# Patient Record
Sex: MALE | Race: WHITE | URBAN - METROPOLITAN AREA
[De-identification: names, ages, dates, MRNs, and addresses within clinical notes are randomized per-mention and may not be internally consistent; named-entity substitution may affect disease eponyms.]

---

## 2017-09-13 ENCOUNTER — FOLLOW-UP (OUTPATIENT)
Dept: URBAN - METROPOLITAN AREA CLINIC 14 | Facility: CLINIC | Age: 52
Setting detail: DERMATOLOGY
End: 2017-09-13

## 2017-09-13 DIAGNOSIS — C44.222 SQUAMOUS CELL CARCINOMA OF SKIN OF RIGHT EAR AND EXTERNAL AURICULAR CANAL: ICD-10-CM

## 2017-09-13 PROCEDURE — 99213 OFFICE O/P EST LOW 20 MIN: CPT

## 2017-11-30 ENCOUNTER — RX ONLY (RX ONLY)
Age: 52
End: 2017-11-30

## 2017-11-30 RX ORDER — CLOBETASOL PROPIONATE 0.5 MG/G
1 APPLICATION CREAM TOPICAL BID
Qty: 60 | Refills: 2 | Status: DISCONTINUED
Start: 2017-11-30 | End: 2019-09-18

## 2017-12-27 ENCOUNTER — RX ONLY (RX ONLY)
Age: 52
End: 2017-12-27

## 2017-12-27 RX ORDER — APREMILAST 30 MG/1
1 TABLET TABLET, FILM COATED ORAL BID
Qty: 60 | Refills: 6 | Status: DISCONTINUED
Start: 2017-12-27 | End: 2019-09-18

## 2018-03-29 ENCOUNTER — OTHER- (OUTPATIENT)
Dept: URBAN - METROPOLITAN AREA CLINIC 14 | Facility: CLINIC | Age: 53
Setting detail: DERMATOLOGY
End: 2018-03-29

## 2018-03-29 DIAGNOSIS — C44.311 BASAL CELL CARCINOMA OF SKIN OF NOSE: ICD-10-CM

## 2018-03-29 PROCEDURE — 99212 OFFICE O/P EST SF 10 MIN: CPT

## 2018-03-29 RX ORDER — CLOBETASOL PROPIONATE 0.5 MG/G
1 APPLICATION CREAM TOPICAL TID
Qty: 120 | Refills: 1 | Status: DISCONTINUED
Start: 2018-03-29 | End: 2019-03-13

## 2018-09-12 ENCOUNTER — RX ONLY (RX ONLY)
Age: 53
End: 2018-09-12

## 2018-09-12 ENCOUNTER — COMPLETE SKIN EXAM (OUTPATIENT)
Dept: URBAN - METROPOLITAN AREA CLINIC 14 | Facility: CLINIC | Age: 53
Setting detail: DERMATOLOGY
End: 2018-09-12

## 2018-09-12 DIAGNOSIS — Z79.899 OTHER LONG-TERM (CURRENT) DRUG THERAPY: ICD-10-CM

## 2018-09-12 DIAGNOSIS — L70.0 ACNE VULGARIS: ICD-10-CM

## 2018-09-12 PROCEDURE — 99212 OFFICE O/P EST SF 10 MIN: CPT

## 2018-09-12 RX ORDER — CALCIPOTRIENE 50 UG/G
1 APPLICATION AEROSOL, FOAM TOPICAL AS DIRECTED
Qty: 1 | Refills: 3 | Status: DISCONTINUED
Start: 2018-09-12 | End: 2019-09-18

## 2018-09-12 RX ORDER — CALCIPOTRIENE 50 UG/G
1 APPLICATION AEROSOL, FOAM TOPICAL AS DIRECTED
Qty: 120 | Refills: 3
Start: 2018-09-12

## 2018-11-01 ENCOUNTER — RX ONLY (RX ONLY)
Age: 53
End: 2018-11-01

## 2018-11-01 RX ORDER — APREMILAST 30 MG/1
1 TABLET TABLET, FILM COATED ORAL BID
Qty: 60 | Refills: 11 | Status: DISCONTINUED
Start: 2018-11-01 | End: 2019-09-18

## 2019-03-13 ENCOUNTER — FOLLOW-UP (OUTPATIENT)
Dept: URBAN - METROPOLITAN AREA CLINIC 14 | Facility: CLINIC | Age: 54
Setting detail: DERMATOLOGY
End: 2019-03-13

## 2019-03-13 DIAGNOSIS — L57.0 ACTINIC KERATOSIS: ICD-10-CM

## 2019-03-13 PROCEDURE — 99212 OFFICE O/P EST SF 10 MIN: CPT

## 2019-03-13 PROCEDURE — 11900 INJECT SKIN LESIONS </W 7: CPT

## 2019-03-13 RX ORDER — CLOBETASOL PROPIONATE 0.5 MG/G
1 APPLICATION CREAM TOPICAL TID
Qty: 120 | Refills: 1 | Status: DISCONTINUED
Start: 2019-03-13 | End: 2019-08-01

## 2019-05-22 ENCOUNTER — RX ONLY (RX ONLY)
Age: 54
End: 2019-05-22

## 2019-05-22 RX ORDER — FINASTERIDE 5 MG/1
0.5/HALF TABLET TABLET, FILM COATED ORAL DAILY
Qty: 45 | Refills: 4
Start: 2019-05-22

## 2019-08-01 ENCOUNTER — RX ONLY (RX ONLY)
Age: 54
End: 2019-08-01

## 2019-08-01 RX ORDER — CLOBETASOL PROPIONATE 0.5 MG/ML
1 ML SOLUTION TOPICAL DAILY
Qty: 50 | Refills: 2 | Status: DISCONTINUED
Start: 2019-08-01 | End: 2020-04-07

## 2019-08-01 RX ORDER — CLOBETASOL PROPIONATE 0.5 MG/G
1 APPLICATION CREAM TOPICAL TID
Qty: 120 | Refills: 1 | Status: DISCONTINUED
Start: 2019-08-01 | End: 2020-04-08

## 2019-09-18 ENCOUNTER — SKIN CHECK (OUTPATIENT)
Dept: URBAN - METROPOLITAN AREA CLINIC 14 | Facility: CLINIC | Age: 54
Setting detail: DERMATOLOGY
End: 2019-09-18

## 2019-09-18 DIAGNOSIS — L91.8 OTHER HYPERTROPHIC DISORDERS OF THE SKIN: ICD-10-CM

## 2019-09-18 DIAGNOSIS — D22.5 MELANOCYTIC NEVI OF TRUNK: ICD-10-CM

## 2019-09-18 DIAGNOSIS — L82.1 OTHER SEBORRHEIC KERATOSIS: ICD-10-CM

## 2019-09-18 PROCEDURE — 99213 OFFICE O/P EST LOW 20 MIN: CPT

## 2019-09-18 RX ORDER — APREMILAST 30 MG/1
1 TABLET TABLET, FILM COATED ORAL BID
Qty: 60 | Refills: 11 | Status: DISCONTINUED
Start: 2019-09-18 | End: 2020-09-10

## 2020-04-07 ENCOUNTER — RX ONLY (RX ONLY)
Age: 55
End: 2020-04-07

## 2020-04-07 RX ORDER — CLOBETASOL PROPIONATE 0.5 MG/ML
1 ML SOLUTION TOPICAL DAILY
Qty: 50 | Refills: 2 | Status: DISCONTINUED
Start: 2020-04-07 | End: 2020-04-08

## 2020-04-08 ENCOUNTER — RX ONLY (RX ONLY)
Age: 55
End: 2020-04-08

## 2020-04-08 RX ORDER — CLOBETASOL PROPIONATE 0.5 MG/G
1 APPLICATION CREAM TOPICAL TID
Qty: 120 | Refills: 1
Start: 2020-04-08

## 2020-06-22 ENCOUNTER — RX ONLY (RX ONLY)
Age: 55
End: 2020-06-22

## 2020-06-22 RX ORDER — FINASTERIDE 5 MG/1
0.5/HALF TABLET TABLET, FILM COATED ORAL DAILY
Qty: 45 | Refills: 1
Start: 2020-06-22

## 2020-07-08 ENCOUNTER — RX ONLY (RX ONLY)
Age: 55
End: 2020-07-08

## 2020-07-08 RX ORDER — APREMILAST 30 MG/1
1 TABLET TABLET, FILM COATED ORAL BID
Qty: 60 | Refills: 2 | Status: DISCONTINUED
Start: 2020-07-08 | End: 2020-09-10

## 2020-07-13 ENCOUNTER — RX ONLY (RX ONLY)
Age: 55
End: 2020-07-13

## 2020-07-13 RX ORDER — APREMILAST 30 MG/1
1 TABLET TABLET, FILM COATED ORAL BID
Qty: 60 | Refills: 2
Start: 2020-07-13

## 2020-09-08 ENCOUNTER — SKIN CHECK (OUTPATIENT)
Dept: URBAN - METROPOLITAN AREA CLINIC 14 | Facility: CLINIC | Age: 55
Setting detail: DERMATOLOGY
End: 2020-09-08

## 2020-09-08 DIAGNOSIS — D48.5 NEOPLASM OF UNCERTAIN BEHAVIOR OF SKIN: ICD-10-CM

## 2020-09-08 PROCEDURE — 11900 INJECT SKIN LESIONS </W 7: CPT

## 2020-09-10 ENCOUNTER — RX ONLY (RX ONLY)
Age: 55
End: 2020-09-10

## 2020-09-10 RX ORDER — CLOBETASOL PROPIONATE 0.5 MG/ML
1 ML SOLUTION TOPICAL DAILY
Qty: 25 | Refills: 4
Start: 2020-09-10

## 2020-10-02 ENCOUNTER — RX ONLY (RX ONLY)
Age: 55
End: 2020-10-02

## 2020-10-02 RX ORDER — APREMILAST 30 MG/1
1 TABLET TABLET, FILM COATED ORAL TWICE A DAY
Qty: 60 | Refills: 11
Start: 2020-10-02

## 2020-11-30 ENCOUNTER — FOLLOW-UP (OUTPATIENT)
Dept: URBAN - METROPOLITAN AREA CLINIC 14 | Facility: CLINIC | Age: 55
Setting detail: DERMATOLOGY
End: 2020-11-30

## 2021-01-07 ENCOUNTER — RX ONLY (RX ONLY)
Age: 56
End: 2021-01-07

## 2021-01-07 RX ORDER — FINASTERIDE 5 MG/1
1/2 TABLET TABLET, FILM COATED ORAL ONCE A DAY
Qty: 45 | Refills: 3
Start: 2021-01-07

## 2021-04-19 ENCOUNTER — RX ONLY (RX ONLY)
Age: 56
End: 2021-04-19

## 2021-04-19 RX ORDER — CLOBETASOL PROPIONATE 0.5 MG/G
A SMALL AMOUNT CREAM TOPICAL THREE TIMES A DAY
Qty: 50 | Refills: 1
Start: 2021-04-19

## 2021-05-25 ENCOUNTER — FOLLOW-UP (OUTPATIENT)
Dept: URBAN - METROPOLITAN AREA CLINIC 14 | Facility: CLINIC | Age: 56
Setting detail: DERMATOLOGY
End: 2021-05-25

## 2021-05-25 DIAGNOSIS — C44.319 BASAL CELL CARCINOMA OF SKIN OF OTHER PARTS OF FACE: ICD-10-CM

## 2021-05-25 PROCEDURE — 99213 OFFICE O/P EST LOW 20 MIN: CPT

## 2021-05-25 PROCEDURE — 11901 INJECT SKIN LESIONS >7: CPT

## 2021-09-09 ENCOUNTER — RX ONLY (RX ONLY)
Age: 56
End: 2021-09-09

## 2021-09-09 RX ORDER — CLOBETASOL PROPIONATE 0.5 MG/G
A SMALL AMOUNT CREAM TOPICAL THREE TIMES A DAY
Qty: 50 | Refills: 3
Start: 2021-09-09

## 2021-09-09 RX ORDER — CLOBETASOL PROPIONATE 0.5 MG/ML
A SMALL AMOUNT SOLUTION TOPICAL ONCE A DAY
Qty: 50 | Refills: 3
Start: 2021-09-09

## 2021-10-15 ENCOUNTER — RX ONLY (RX ONLY)
Age: 56
End: 2021-10-15

## 2021-10-15 RX ORDER — APREMILAST 30 MG/1
1 TABLET TABLET, FILM COATED ORAL TWICE A DAY
Qty: 60 | Refills: 7
Start: 2021-10-15

## 2022-01-04 ENCOUNTER — RX ONLY (RX ONLY)
Age: 57
End: 2022-01-04

## 2022-01-04 RX ORDER — FINASTERIDE 5 MG/1
1/2 TABLET TABLET, FILM COATED ORAL ONCE A DAY
Qty: 45 | Refills: 1
Start: 2022-01-04

## 2022-02-17 ENCOUNTER — RX ONLY (RX ONLY)
Age: 57
End: 2022-02-17

## 2022-02-17 ENCOUNTER — COMPLETE SKIN EXAM (OUTPATIENT)
Dept: URBAN - METROPOLITAN AREA CLINIC 14 | Facility: CLINIC | Age: 57
Setting detail: DERMATOLOGY
End: 2022-02-17

## 2022-02-17 DIAGNOSIS — L57.0 ACTINIC KERATOSIS: ICD-10-CM

## 2022-02-17 PROCEDURE — 11900 INJECT SKIN LESIONS </W 7: CPT

## 2022-02-17 PROCEDURE — 99214 OFFICE O/P EST MOD 30 MIN: CPT

## 2022-02-17 RX ORDER — AZITHROMYCIN DIHYDRATE 500 MG/1
1 TABLET TABLET, FILM COATED ORAL ONCE A DAY
Qty: 3 | Refills: 1
Start: 2022-02-17

## 2022-06-15 ENCOUNTER — FOLLOW-UP (OUTPATIENT)
Dept: URBAN - METROPOLITAN AREA CLINIC 14 | Facility: CLINIC | Age: 57
Setting detail: DERMATOLOGY
End: 2022-06-15

## 2022-06-15 DIAGNOSIS — L82.0 INFLAMED SEBORRHEIC KERATOSIS: ICD-10-CM

## 2022-06-15 PROCEDURE — 11900 INJECT SKIN LESIONS </W 7: CPT

## 2022-07-05 ENCOUNTER — RX ONLY (RX ONLY)
Age: 57
End: 2022-07-05

## 2022-07-05 RX ORDER — APREMILAST 30 MG/1
1 TABLET TABLET, FILM COATED ORAL TWICE A DAY
Qty: 60 | Refills: 11
Start: 2022-07-05

## 2022-07-12 ENCOUNTER — RX ONLY (RX ONLY)
Age: 57
End: 2022-07-12

## 2022-07-12 RX ORDER — FINASTERIDE 5 MG/1
1/2 TABLET TABLET, FILM COATED ORAL ONCE A DAY
Qty: 23 | Refills: 4
Start: 2022-07-12

## 2022-10-04 ENCOUNTER — RX ONLY (RX ONLY)
Age: 57
End: 2022-10-04

## 2022-10-04 RX ORDER — CLOBETASOL PROPIONATE 0.5 MG/ML
SOLUTION TOPICAL
Qty: 50 | Refills: 3 | Status: ERX | COMMUNITY
Start: 2022-10-04

## 2022-11-15 ENCOUNTER — APPOINTMENT (OUTPATIENT)
Dept: URBAN - METROPOLITAN AREA CLINIC 210 | Age: 57
Setting detail: DERMATOLOGY
End: 2022-11-27

## 2022-11-15 DIAGNOSIS — L40.0 PSORIASIS VULGARIS: ICD-10-CM

## 2022-11-15 DIAGNOSIS — D18.0 HEMANGIOMA: ICD-10-CM

## 2022-11-15 DIAGNOSIS — L72.0 EPIDERMAL CYST: ICD-10-CM

## 2022-11-15 PROBLEM — D18.01 HEMANGIOMA OF SKIN AND SUBCUTANEOUS TISSUE: Status: ACTIVE | Noted: 2022-11-15

## 2022-11-15 PROCEDURE — OTHER INTRALESIONAL KENALOG: OTHER

## 2022-11-15 PROCEDURE — OTHER MIPS QUALITY: OTHER

## 2022-11-15 PROCEDURE — 11900 INJECT SKIN LESIONS </W 7: CPT

## 2022-11-15 PROCEDURE — OTHER COUNSELING: OTHER

## 2022-11-15 PROCEDURE — 99212 OFFICE O/P EST SF 10 MIN: CPT | Mod: 25

## 2022-11-15 ASSESSMENT — LOCATION DETAILED DESCRIPTION DERM
LOCATION DETAILED: LEFT SUPERIOR LATERAL MALAR CHEEK
LOCATION DETAILED: LEFT INFERIOR TEMPLE
LOCATION DETAILED: RIGHT PROXIMAL POSTERIOR THIGH
LOCATION DETAILED: LEFT BUTTOCK
LOCATION DETAILED: LEFT POSTERIOR ANKLE
LOCATION DETAILED: LEFT SUPERIOR CENTRAL MALAR CHEEK

## 2022-11-15 ASSESSMENT — LOCATION ZONE DERM
LOCATION ZONE: TRUNK
LOCATION ZONE: FACE
LOCATION ZONE: LEG

## 2022-11-15 ASSESSMENT — LOCATION SIMPLE DESCRIPTION DERM
LOCATION SIMPLE: LEFT CHEEK
LOCATION SIMPLE: LEFT ANKLE
LOCATION SIMPLE: RIGHT POSTERIOR THIGH
LOCATION SIMPLE: LEFT BUTTOCK
LOCATION SIMPLE: LEFT TEMPLE

## 2022-11-15 NOTE — HPI: PSORIASIS (PATIENT REPORTED)
Have You Been Diagnosed With Psoriatic Arthritis?: yes
Where Is Your Psoriasis Located?: Back of leg

## 2022-11-15 NOTE — PROCEDURE: INTRALESIONAL KENALOG
Medical Necessity Clause: This procedure was medically necessary because the lesions that were treated were:
How Many Mls Were Removed From The 40 Mg/Ml (1ml) Vial When Preparing The Injectable Solution?: 0
Kenalog Preparation: Kenalog
Show Inventory Tab: Hide
Detail Level: Detailed
Include Z78.9 (Other Specified Conditions Influencing Health Status) As An Associated Diagnosis?: No
Total Volume Injected (Ccs- Only Use Numbers And Decimals): 12
Consent: The risks of atrophy were reviewed with the patient.
Validate Note Data When Using Inventory: Yes
Concentration Of Solution Injected (Mg/Ml): 2.5

## 2023-01-02 ENCOUNTER — RX ONLY (RX ONLY)
Age: 58
End: 2023-01-02

## 2023-01-02 RX ORDER — FINASTERIDE 5 MG/1
TABLET, FILM COATED ORAL
Qty: 45 | Refills: 3 | Status: ERX | COMMUNITY
Start: 2023-01-02

## 2023-06-27 ENCOUNTER — RX ONLY (RX ONLY)
Age: 58
End: 2023-06-27

## 2023-06-27 RX ORDER — APREMILAST 30 MG/1
30MG TABLET, FILM COATED ORAL
Qty: 60 | Refills: 6 | Status: ERX

## 2023-09-22 ENCOUNTER — RX ONLY (RX ONLY)
Age: 58
End: 2023-09-22

## 2023-09-22 RX ORDER — AZITHROMYCIN DIHYDRATE 250 MG/1
TABLET, FILM COATED ORAL
Qty: 1 | Refills: 0 | Status: ERX | COMMUNITY
Start: 2023-09-22

## 2023-09-22 RX ORDER — GENTAMICIN SULFATE 1 MG/G
OINTMENT TOPICAL
Qty: 30 | Refills: 1 | Status: ERX | COMMUNITY
Start: 2023-09-22

## 2023-09-22 RX ORDER — KETOCONAZOLE 20 MG/G
CREAM TOPICAL
Qty: 60 | Refills: 1 | Status: ERX | COMMUNITY
Start: 2023-09-22

## 2023-11-15 ENCOUNTER — APPOINTMENT (OUTPATIENT)
Dept: URBAN - METROPOLITAN AREA CLINIC 210 | Age: 58
Setting detail: DERMATOLOGY
End: 2023-11-15

## 2023-11-15 DIAGNOSIS — L40.0 PSORIASIS VULGARIS: ICD-10-CM

## 2023-11-15 PROBLEM — L30.9 DERMATITIS, UNSPECIFIED: Status: ACTIVE | Noted: 2023-11-15

## 2023-11-15 PROCEDURE — OTHER SEPARATE AND IDENTIFIABLE DOCUMENTATION: OTHER

## 2023-11-15 PROCEDURE — OTHER MIPS QUALITY: OTHER

## 2023-11-15 PROCEDURE — OTHER OTEZLA MONITORING: OTHER

## 2023-11-15 PROCEDURE — OTHER COUNSELING: OTHER

## 2023-11-15 PROCEDURE — OTHER INTRALESIONAL KENALOG: OTHER

## 2023-11-15 PROCEDURE — 11900 INJECT SKIN LESIONS </W 7: CPT

## 2023-11-15 PROCEDURE — 99213 OFFICE O/P EST LOW 20 MIN: CPT | Mod: 25

## 2023-11-15 ASSESSMENT — LOCATION DETAILED DESCRIPTION DERM
LOCATION DETAILED: LEFT DISTAL POSTERIOR THIGH
LOCATION DETAILED: LEFT ANTERIOR PROXIMAL THIGH
LOCATION DETAILED: RIGHT DISTAL POSTERIOR THIGH

## 2023-11-15 ASSESSMENT — LOCATION ZONE DERM: LOCATION ZONE: LEG

## 2023-11-15 ASSESSMENT — BSA PSORIASIS: % BODY COVERED IN PSORIASIS: 9

## 2023-11-15 ASSESSMENT — LOCATION SIMPLE DESCRIPTION DERM
LOCATION SIMPLE: LEFT THIGH
LOCATION SIMPLE: RIGHT POSTERIOR THIGH
LOCATION SIMPLE: LEFT POSTERIOR THIGH

## 2023-11-15 ASSESSMENT — ITCH NUMERIC RATING SCALE: HOW SEVERE IS YOUR ITCHING?: 5

## 2023-11-15 NOTE — PROCEDURE: INTRALESIONAL KENALOG
Medical Necessity Clause: This procedure was medically necessary because the lesions that were treated were:
How Many Mls Were Removed From The 40 Mg/Ml (1ml) Vial When Preparing The Injectable Solution?: 0
Kenalog Preparation: Kenalog
Show Inventory Tab: Hide
Detail Level: Simple
Include Z78.9 (Other Specified Conditions Influencing Health Status) As An Associated Diagnosis?: No
Total Volume Injected (Ccs- Only Use Numbers And Decimals): 4
Consent: The risks of atrophy were reviewed with the patient.
Validate Note Data When Using Inventory: Yes
Concentration Of Solution Injected (Mg/Ml): 2.5
Kenalog Type Of Vial: Multiple Dose

## 2023-11-15 NOTE — HPI: RASH (PSORIASIS)
How Severe Is Your Psoriasis?: moderate
Is This A New Presentation, Or A Follow-Up?: Follow Up Psoriasis
Tachypnea

## 2024-01-16 ENCOUNTER — RX ONLY (RX ONLY)
Age: 59
End: 2024-01-16

## 2024-01-16 RX ORDER — APREMILAST 30 MG/1
30MG TABLET, FILM COATED ORAL
Qty: 60 | Refills: 6 | Status: ERX

## 2024-01-22 ENCOUNTER — RX ONLY (RX ONLY)
Age: 59
End: 2024-01-22

## 2024-01-22 RX ORDER — CLOBETASOL PROPIONATE 0.5 MG/ML
SOLUTION TOPICAL
Qty: 50 | Refills: 1 | Status: ERX

## 2024-01-22 RX ORDER — CLOBETASOL PROPIONATE 0.5 MG/G
CREAM TOPICAL
Qty: 45 | Refills: 1 | Status: ERX | COMMUNITY
Start: 2024-01-22

## 2024-01-22 RX ORDER — FINASTERIDE 5 MG/1
TABLET, FILM COATED ORAL
Qty: 45 | Refills: 3 | Status: ERX

## 2024-02-27 ENCOUNTER — RX ONLY (RX ONLY)
Age: 59
End: 2024-02-27

## 2024-02-27 RX ORDER — APREMILAST 30 MG/1
30MG TABLET, FILM COATED ORAL
Qty: 60 | Refills: 6 | Status: ERX

## 2024-05-15 ENCOUNTER — APPOINTMENT (OUTPATIENT)
Dept: URBAN - METROPOLITAN AREA CLINIC 210 | Age: 59
Setting detail: DERMATOLOGY
End: 2024-05-18

## 2024-05-15 DIAGNOSIS — L81.4 OTHER MELANIN HYPERPIGMENTATION: ICD-10-CM

## 2024-05-15 DIAGNOSIS — D18.0 HEMANGIOMA: ICD-10-CM

## 2024-05-15 DIAGNOSIS — L64.8 OTHER ANDROGENIC ALOPECIA: ICD-10-CM

## 2024-05-15 DIAGNOSIS — L40.0 PSORIASIS VULGARIS: ICD-10-CM

## 2024-05-15 DIAGNOSIS — D22 MELANOCYTIC NEVI: ICD-10-CM

## 2024-05-15 PROBLEM — D22.5 MELANOCYTIC NEVI OF TRUNK: Status: ACTIVE | Noted: 2024-05-15

## 2024-05-15 PROBLEM — D18.01 HEMANGIOMA OF SKIN AND SUBCUTANEOUS TISSUE: Status: ACTIVE | Noted: 2024-05-15

## 2024-05-15 PROCEDURE — OTHER MIPS QUALITY: OTHER

## 2024-05-15 PROCEDURE — OTHER ADDITIONAL NOTES: OTHER

## 2024-05-15 PROCEDURE — OTHER OTEZLA MONITORING: OTHER

## 2024-05-15 PROCEDURE — OTHER PRESCRIPTION MEDICATION MANAGEMENT: OTHER

## 2024-05-15 PROCEDURE — 99214 OFFICE O/P EST MOD 30 MIN: CPT

## 2024-05-15 PROCEDURE — OTHER COUNSELING: OTHER

## 2024-05-15 ASSESSMENT — LOCATION SIMPLE DESCRIPTION DERM
LOCATION SIMPLE: LEFT UPPER BACK
LOCATION SIMPLE: RIGHT SHOULDER
LOCATION SIMPLE: LEFT POSTERIOR THIGH
LOCATION SIMPLE: ABDOMEN
LOCATION SIMPLE: LEFT SHOULDER
LOCATION SIMPLE: ANTERIOR SCALP

## 2024-05-15 ASSESSMENT — LOCATION ZONE DERM
LOCATION ZONE: LEG
LOCATION ZONE: SCALP
LOCATION ZONE: ARM
LOCATION ZONE: TRUNK

## 2024-05-15 ASSESSMENT — LOCATION DETAILED DESCRIPTION DERM
LOCATION DETAILED: LEFT POSTERIOR SHOULDER
LOCATION DETAILED: LEFT DISTAL POSTERIOR THIGH
LOCATION DETAILED: LEFT SUPERIOR LATERAL UPPER BACK
LOCATION DETAILED: RIGHT POSTERIOR SHOULDER
LOCATION DETAILED: MID-FRONTAL SCALP
LOCATION DETAILED: PERIUMBILICAL SKIN

## 2024-05-15 ASSESSMENT — BSA PSORIASIS: % BODY COVERED IN PSORIASIS: 4

## 2024-05-15 NOTE — PROCEDURE: PRESCRIPTION MEDICATION MANAGEMENT
Continue Regimen: Finasteride 1mg QD
Detail Level: Zone
Render In Strict Bullet Format?: No
Continue Regimen: Clobetasol cream 1-2 x daily only as needed

## 2024-05-15 NOTE — PROCEDURE: ADDITIONAL NOTES
Additional Notes: Discussed future treatment with Sotyktu of patient has reoccurring breakthroughs. Patient denies change in medication at this time. [facility] Information given to patient.
Detail Level: Simple
Render Risk Assessment In Note?: no

## 2024-05-15 NOTE — PROCEDURE: MIPS QUALITY
Quality 410: Psoriasis Clinical Response To Oral Systemic Or Biologic Medications: Psoriasis Assessment Tool Documented, Met Specified Benchmark
Quality 226: Preventive Care And Screening: Tobacco Use: Screening And Cessation Intervention: Patient screened for tobacco use and is an ex/non-smoker
Quality 431: Preventive Care And Screening: Unhealthy Alcohol Use - Screening: Patient not identified as an unhealthy alcohol user when screened for unhealthy alcohol use using a systematic screening method
Detail Level: Detailed
Quality 130: Documentation Of Current Medications In The Medical Record: Current Medications Documented

## 2024-11-19 ENCOUNTER — RX ONLY (RX ONLY)
Age: 59
End: 2024-11-19

## 2024-11-19 RX ORDER — APREMILAST 30 MG/1
30MG TABLET, FILM COATED ORAL
Qty: 60 | Refills: 5 | Status: ERX

## 2025-01-16 ENCOUNTER — RX ONLY (RX ONLY)
Age: 60
End: 2025-01-16

## 2025-01-16 RX ORDER — FINASTERIDE 5 MG/1
TABLET, FILM COATED ORAL
Qty: 45 | Refills: 3 | Status: ERX

## 2025-02-25 ENCOUNTER — RX ONLY (RX ONLY)
Age: 60
End: 2025-02-25

## 2025-02-25 RX ORDER — APREMILAST 30 MG/1
30MG TABLET, FILM COATED ORAL
Qty: 60 | Refills: 3 | Status: ERX

## 2025-07-17 ENCOUNTER — RX ONLY (RX ONLY)
Age: 60
End: 2025-07-17

## 2025-07-17 RX ORDER — APREMILAST 30 MG/1
30MG TABLET, FILM COATED ORAL
Qty: 60 | Refills: 1 | Status: ERX

## 2025-08-28 ENCOUNTER — APPOINTMENT (OUTPATIENT)
Dept: URBAN - METROPOLITAN AREA CLINIC 210 | Age: 60
Setting detail: DERMATOLOGY
End: 2025-09-02

## 2025-08-28 DIAGNOSIS — L40.0 PSORIASIS VULGARIS: ICD-10-CM

## 2025-08-28 DIAGNOSIS — D18.0 HEMANGIOMA: ICD-10-CM

## 2025-08-28 DIAGNOSIS — D22 MELANOCYTIC NEVI: ICD-10-CM

## 2025-08-28 DIAGNOSIS — L81.4 OTHER MELANIN HYPERPIGMENTATION: ICD-10-CM

## 2025-08-28 PROBLEM — D18.01 HEMANGIOMA OF SKIN AND SUBCUTANEOUS TISSUE: Status: ACTIVE | Noted: 2025-08-28

## 2025-08-28 PROBLEM — D22.5 MELANOCYTIC NEVI OF TRUNK: Status: ACTIVE | Noted: 2025-08-28

## 2025-08-28 PROCEDURE — OTHER PRESCRIPTION MEDICATION MANAGEMENT: OTHER

## 2025-08-28 PROCEDURE — OTHER MIPS QUALITY: OTHER

## 2025-08-28 PROCEDURE — OTHER PRESCRIPTION: OTHER

## 2025-08-28 PROCEDURE — OTHER OTEZLA MONITORING: OTHER

## 2025-08-28 PROCEDURE — 99213 OFFICE O/P EST LOW 20 MIN: CPT

## 2025-08-28 PROCEDURE — OTHER COUNSELING: OTHER

## 2025-08-28 RX ORDER — CLOBETASOL PROPIONATE 0.5 MG/ML
SOLUTION TOPICAL
Qty: 50 | Refills: 6 | Status: ERX

## 2025-08-28 RX ORDER — APREMILAST 30 MG/1
TABLET, FILM COATED ORAL BID
Qty: 60 | Refills: 11 | Status: ERX

## 2025-08-28 RX ORDER — TAPINAROF 10 MG/1000MG
CREAM TOPICAL
Qty: 60 | Refills: 6 | Status: ERX | COMMUNITY
Start: 2025-08-28

## 2025-08-28 ASSESSMENT — LOCATION SIMPLE DESCRIPTION DERM
LOCATION SIMPLE: LEFT UPPER BACK
LOCATION SIMPLE: LEFT SHOULDER
LOCATION SIMPLE: ABDOMEN
LOCATION SIMPLE: RIGHT SHOULDER
LOCATION SIMPLE: LEFT POSTERIOR THIGH

## 2025-08-28 ASSESSMENT — LOCATION DETAILED DESCRIPTION DERM
LOCATION DETAILED: LEFT DISTAL POSTERIOR THIGH
LOCATION DETAILED: LEFT POSTERIOR SHOULDER
LOCATION DETAILED: RIGHT POSTERIOR SHOULDER
LOCATION DETAILED: LEFT SUPERIOR LATERAL UPPER BACK
LOCATION DETAILED: PERIUMBILICAL SKIN

## 2025-08-28 ASSESSMENT — BSA PSORIASIS: % BODY COVERED IN PSORIASIS: 1

## 2025-08-28 ASSESSMENT — LOCATION ZONE DERM
LOCATION ZONE: ARM
LOCATION ZONE: TRUNK
LOCATION ZONE: LEG

## 2025-08-28 ASSESSMENT — PGA PSORIASIS: PGA PSORIASIS 2020: ALMOST CLEAR

## 2025-08-28 ASSESSMENT — ITCH NUMERIC RATING SCALE: HOW SEVERE IS YOUR ITCHING?: 2
